# Patient Record
Sex: FEMALE | Race: ASIAN | NOT HISPANIC OR LATINO
[De-identification: names, ages, dates, MRNs, and addresses within clinical notes are randomized per-mention and may not be internally consistent; named-entity substitution may affect disease eponyms.]

---

## 2021-04-09 PROBLEM — Z00.00 ENCOUNTER FOR PREVENTIVE HEALTH EXAMINATION: Status: ACTIVE | Noted: 2021-04-09

## 2021-04-19 ENCOUNTER — APPOINTMENT (OUTPATIENT)
Dept: PLASTIC SURGERY | Facility: CLINIC | Age: 26
End: 2021-04-19
Payer: MEDICAID

## 2021-04-19 VITALS — BODY MASS INDEX: 24.8 KG/M2 | TEMPERATURE: 97.3 F | WEIGHT: 140 LBS | HEIGHT: 63 IN

## 2021-04-19 DIAGNOSIS — M54.2 CERVICALGIA: ICD-10-CM

## 2021-04-19 DIAGNOSIS — M25.519 PAIN IN UNSPECIFIED SHOULDER: ICD-10-CM

## 2021-04-19 DIAGNOSIS — G89.29 CERVICALGIA: ICD-10-CM

## 2021-04-19 PROCEDURE — 99072 ADDL SUPL MATRL&STAF TM PHE: CPT

## 2021-04-19 PROCEDURE — 99204 OFFICE O/P NEW MOD 45 MIN: CPT

## 2021-04-20 PROBLEM — M25.519 SHOULDER JOINT PAIN: Status: ACTIVE | Noted: 2021-04-20

## 2021-04-20 PROBLEM — M54.2 NECK PAIN, CHRONIC: Status: ACTIVE | Noted: 2021-04-20

## 2021-04-22 NOTE — PHYSICAL EXAM
[NI] : Normal [de-identified] : Bilateral breast ptosis, macromastia S:N: (L) 23.5cm, (R) 23.5cm, Base Width: (L) 13.5cm, (R) 13 cm, N:IMF: (L) 13.5cm, (R) 13 cm, Aerola: (L) 6.5cm, (R) 7 cm. No abnormal palpable masses/cysts.\par

## 2021-04-22 NOTE — HISTORY OF PRESENT ILLNESS
[FreeTextEntry1] : Pt current bra size is a 32E, she would like to consult for bilateral breast reduction.  Pt states she develops rashes in between bilateral breast folds, pt had tried topical cream for candida infection but see little improvement. \par Pt experiences shoulder groove indentation, back pain, neck pain, shoulder pain, and tension headaches. She is having difficulty focusing at work due to headaches, unable to fully raise her bilateral arms throughout the day due to shoulder pain. \par Her pain level is a 7/10, she takes OTC medications for pain relief for about one to two years. She have tried PT for a year in the past but see little relief.\par  Pt has no personal or family history of breast cancer. \par  No history of cystic breast tissue, clots, or miscarriages.\par

## 2021-04-28 ENCOUNTER — RESULT REVIEW (OUTPATIENT)
Age: 26
End: 2021-04-28

## 2021-04-28 ENCOUNTER — APPOINTMENT (OUTPATIENT)
Dept: MAMMOGRAPHY | Facility: CLINIC | Age: 26
End: 2021-04-28
Payer: MEDICAID

## 2021-04-28 ENCOUNTER — OUTPATIENT (OUTPATIENT)
Dept: OUTPATIENT SERVICES | Facility: HOSPITAL | Age: 26
LOS: 1 days | End: 2021-04-28

## 2021-04-28 ENCOUNTER — APPOINTMENT (OUTPATIENT)
Dept: ULTRASOUND IMAGING | Facility: CLINIC | Age: 26
End: 2021-04-28
Payer: MEDICAID

## 2021-04-28 PROCEDURE — 76641 ULTRASOUND BREAST COMPLETE: CPT | Mod: 26,50

## 2021-06-01 ENCOUNTER — OUTPATIENT (OUTPATIENT)
Dept: OUTPATIENT SERVICES | Facility: HOSPITAL | Age: 26
LOS: 1 days | End: 2021-06-01

## 2021-06-01 VITALS
HEIGHT: 64 IN | SYSTOLIC BLOOD PRESSURE: 110 MMHG | TEMPERATURE: 98 F | RESPIRATION RATE: 16 BRPM | DIASTOLIC BLOOD PRESSURE: 70 MMHG | OXYGEN SATURATION: 99 % | WEIGHT: 147.93 LBS | HEART RATE: 95 BPM

## 2021-06-01 DIAGNOSIS — N62 HYPERTROPHY OF BREAST: ICD-10-CM

## 2021-06-01 DIAGNOSIS — Z98.890 OTHER SPECIFIED POSTPROCEDURAL STATES: Chronic | ICD-10-CM

## 2021-06-01 DIAGNOSIS — K08.409 PARTIAL LOSS OF TEETH, UNSPECIFIED CAUSE, UNSPECIFIED CLASS: Chronic | ICD-10-CM

## 2021-06-01 LAB
HCG UR QL: NEGATIVE — SIGNIFICANT CHANGE UP
HCT VFR BLD CALC: 39.9 % — SIGNIFICANT CHANGE UP (ref 34.5–45)
HGB BLD-MCNC: 13.4 G/DL — SIGNIFICANT CHANGE UP (ref 11.5–15.5)
MCHC RBC-ENTMCNC: 24.1 PG — LOW (ref 27–34)
MCHC RBC-ENTMCNC: 33.6 GM/DL — SIGNIFICANT CHANGE UP (ref 32–36)
MCV RBC AUTO: 71.9 FL — LOW (ref 80–100)
NRBC # BLD: 0 /100 WBCS — SIGNIFICANT CHANGE UP
NRBC # FLD: 0 K/UL — SIGNIFICANT CHANGE UP
PLATELET # BLD AUTO: 540 K/UL — HIGH (ref 150–400)
RBC # BLD: 5.55 M/UL — HIGH (ref 3.8–5.2)
RBC # FLD: 14 % — SIGNIFICANT CHANGE UP (ref 10.3–14.5)
WBC # BLD: 8.57 K/UL — SIGNIFICANT CHANGE UP (ref 3.8–10.5)
WBC # FLD AUTO: 8.57 K/UL — SIGNIFICANT CHANGE UP (ref 3.8–10.5)

## 2021-06-01 RX ORDER — SODIUM CHLORIDE 9 MG/ML
3 INJECTION INTRAMUSCULAR; INTRAVENOUS; SUBCUTANEOUS EVERY 8 HOURS
Refills: 0 | Status: DISCONTINUED | OUTPATIENT
Start: 2021-06-10 | End: 2021-06-24

## 2021-06-01 RX ORDER — SODIUM CHLORIDE 9 MG/ML
1000 INJECTION, SOLUTION INTRAVENOUS
Refills: 0 | Status: DISCONTINUED | OUTPATIENT
Start: 2021-06-10 | End: 2021-06-24

## 2021-06-01 NOTE — H&P PST ADULT - NSICDXFAMILYHX_GEN_ALL_CORE_FT
FAMILY HISTORY:  Grandparent  Still living? Unknown  FH: breast cancer, Age at diagnosis: Age Unknown

## 2021-06-01 NOTE — H&P PST ADULT - HISTORY OF PRESENT ILLNESS
27 y/o female presents to PST preop for bilateral breast reduction. pt reports chronic bilateral shoulder and neck pain that has not been relieved with OTC pain meds or PT.

## 2021-06-01 NOTE — H&P PST ADULT - HEALTH CARE MAINTENANCE
Pt states she received Moderna COVID-19 vaccine   1st dose 4/11/21  2nd dose 5/4/21- card not present during PST visit

## 2021-06-01 NOTE — H&P PST ADULT - NSICDXPROBLEM_GEN_ALL_CORE_FT
PROBLEM DIAGNOSES  Problem: Hypertrophy of breast  Assessment and Plan: preop for bilateral breast reduction on 6/10/21  preop instructions given, pt verbalized understanding  GI prophylaxis and chlorhexidine wash provided  pt is scheduled for COVID testing preop         PROBLEM DIAGNOSES  Problem: Hypertrophy of breast  Assessment and Plan: preop for bilateral breast reduction on 6/10/21  preop instructions given, pt verbalized understanding  GI prophylaxis and chlorhexidine wash provided  pt is scheduled for COVID testing preop  ECHO requested from PCP

## 2021-06-01 NOTE — H&P PST ADULT - NSANTHBMIRD_ENT_A_CORE
Add 52 Modifier (Optional): no Detail Level: Detailed Consent: The patient's consent was obtained including but not limited to risks of crusting, scabbing, blistering, scarring, darker or lighter pigmentary change, recurrence, incomplete removal and infection. Post-Care Instructions: I reviewed with the patient in detail post-care instructions. Patient is to wear sunprotection, and avoid picking at any of the treated lesions. Pt may apply Vaseline to crusted or scabbing areas. Medical Necessity Clause: This procedure was medically necessary because the lesions that were treated were: Medical Necessity Information: It is in your best interest to select a reason for this procedure from the list below. All of these items fulfill various CMS LCD requirements except the new and changing color options. No

## 2021-06-01 NOTE — H&P PST ADULT - NS PRO FEM REPRO HEALTH SCREEN
breast US Apil 2021. pt states benign breast nodule noted bilaterally breast US Apil 2021. pt reports benign breast nodules noted bilaterally

## 2021-06-01 NOTE — H&P PST ADULT - RS GEN PE MLT RESP DETAILS PC
airway patent/breath sounds equal/respirations non-labored/clear to auscultation bilaterally/no wheezes

## 2021-06-06 DIAGNOSIS — Z01.818 ENCOUNTER FOR OTHER PREPROCEDURAL EXAMINATION: ICD-10-CM

## 2021-06-07 ENCOUNTER — APPOINTMENT (OUTPATIENT)
Dept: DISASTER EMERGENCY | Facility: CLINIC | Age: 26
End: 2021-06-07

## 2021-06-07 PROBLEM — N62 HYPERTROPHY OF BREAST: Chronic | Status: ACTIVE | Noted: 2021-06-01

## 2021-06-08 LAB — SARS-COV-2 N GENE NPH QL NAA+PROBE: NOT DETECTED

## 2021-06-09 ENCOUNTER — TRANSCRIPTION ENCOUNTER (OUTPATIENT)
Age: 26
End: 2021-06-09

## 2021-06-09 NOTE — ASU PATIENT PROFILE, ADULT - TEACHING/LEARNING FACTORS INFLUENCE READINESS TO LEARN
skin normal color for race, warm, dry and intact. 1 cm obliquely orientated laceration over nasal bridge.
none

## 2021-06-10 ENCOUNTER — RESULT REVIEW (OUTPATIENT)
Age: 26
End: 2021-06-10

## 2021-06-10 ENCOUNTER — OUTPATIENT (OUTPATIENT)
Dept: OUTPATIENT SERVICES | Facility: HOSPITAL | Age: 26
LOS: 1 days | Discharge: HOME | End: 2021-06-10
Payer: MEDICAID

## 2021-06-10 ENCOUNTER — APPOINTMENT (OUTPATIENT)
Dept: PLASTIC SURGERY | Facility: HOSPITAL | Age: 26
End: 2021-06-10
Payer: MEDICAID

## 2021-06-10 VITALS
OXYGEN SATURATION: 100 % | RESPIRATION RATE: 14 BRPM | TEMPERATURE: 97 F | HEART RATE: 104 BPM | DIASTOLIC BLOOD PRESSURE: 57 MMHG | SYSTOLIC BLOOD PRESSURE: 105 MMHG

## 2021-06-10 VITALS
OXYGEN SATURATION: 96 % | RESPIRATION RATE: 16 BRPM | SYSTOLIC BLOOD PRESSURE: 118 MMHG | DIASTOLIC BLOOD PRESSURE: 80 MMHG | TEMPERATURE: 98 F | HEART RATE: 92 BPM | HEIGHT: 64 IN | WEIGHT: 147.93 LBS

## 2021-06-10 DIAGNOSIS — N62 HYPERTROPHY OF BREAST: ICD-10-CM

## 2021-06-10 DIAGNOSIS — Z98.890 OTHER SPECIFIED POSTPROCEDURAL STATES: Chronic | ICD-10-CM

## 2021-06-10 DIAGNOSIS — K08.409 PARTIAL LOSS OF TEETH, UNSPECIFIED CAUSE, UNSPECIFIED CLASS: Chronic | ICD-10-CM

## 2021-06-10 LAB — HCG UR QL: NEGATIVE — SIGNIFICANT CHANGE UP

## 2021-06-10 PROCEDURE — 88305 TISSUE EXAM BY PATHOLOGIST: CPT | Mod: 26

## 2021-06-10 PROCEDURE — 19318 BREAST REDUCTION: CPT | Mod: LT

## 2021-06-10 RX ORDER — ONDANSETRON 8 MG/1
4 TABLET, FILM COATED ORAL ONCE
Refills: 0 | Status: DISCONTINUED | OUTPATIENT
Start: 2021-06-10 | End: 2021-06-24

## 2021-06-10 RX ORDER — LANOLIN ALCOHOL/MO/W.PET/CERES
1 CREAM (GRAM) TOPICAL
Qty: 0 | Refills: 0 | DISCHARGE

## 2021-06-10 RX ORDER — HYDROMORPHONE HYDROCHLORIDE 2 MG/ML
0.5 INJECTION INTRAMUSCULAR; INTRAVENOUS; SUBCUTANEOUS ONCE
Refills: 0 | Status: DISCONTINUED | OUTPATIENT
Start: 2021-06-10 | End: 2021-06-10

## 2021-06-10 RX ORDER — FENTANYL CITRATE 50 UG/ML
25 INJECTION INTRAVENOUS
Refills: 0 | Status: DISCONTINUED | OUTPATIENT
Start: 2021-06-10 | End: 2021-06-10

## 2021-06-10 RX ORDER — PSEUDOEPHEDRINE HCL 30 MG
1 TABLET ORAL
Qty: 0 | Refills: 0 | DISCHARGE

## 2021-06-10 RX ORDER — ASCORBIC ACID 60 MG
1 TABLET,CHEWABLE ORAL
Qty: 0 | Refills: 0 | DISCHARGE

## 2021-06-10 RX ORDER — OXYCODONE HYDROCHLORIDE 5 MG/1
1 TABLET ORAL
Qty: 15 | Refills: 0
Start: 2021-06-10 | End: 2021-06-11

## 2021-06-10 RX ADMIN — FENTANYL CITRATE 25 MICROGRAM(S): 50 INJECTION INTRAVENOUS at 15:37

## 2021-06-10 RX ADMIN — FENTANYL CITRATE 25 MICROGRAM(S): 50 INJECTION INTRAVENOUS at 16:00

## 2021-06-10 RX ADMIN — HYDROMORPHONE HYDROCHLORIDE 0.5 MILLIGRAM(S): 2 INJECTION INTRAMUSCULAR; INTRAVENOUS; SUBCUTANEOUS at 16:12

## 2021-06-10 RX ADMIN — HYDROMORPHONE HYDROCHLORIDE 0.5 MILLIGRAM(S): 2 INJECTION INTRAMUSCULAR; INTRAVENOUS; SUBCUTANEOUS at 16:28

## 2021-06-10 NOTE — ASU DISCHARGE PLAN (ADULT/PEDIATRIC) - CARE PROVIDER_API CALL
Jacinto Santiago  PLASTIC SURGERY  [address] [place] [id number]  Phone: [phone]  Fax: [phone]  Follow Up Time:

## 2021-06-10 NOTE — ASU DISCHARGE PLAN (ADULT/PEDIATRIC) - FOLLOW UP APPOINTMENTS
may also call Recovery Room (PACU) 24/7 @ [phone]/[facility]

## 2021-06-10 NOTE — ASU DISCHARGE PLAN (ADULT/PEDIATRIC) - ASU DC SPECIAL INSTRUCTIONSFT
Please follow up with Dr. Santiago within 1 week of discharge from the hospital. You may call [phone] to schedule an appointment.     You may shower 24 hours following surgery. Please continue to wear your surgical bra for comfort and support. So not scrub your incisions, let water run over them and pat dry.     Please take the medications sent to your pharmacy as directed for pain.

## 2021-06-16 LAB — SURGICAL PATHOLOGY STUDY: SIGNIFICANT CHANGE UP

## 2021-06-22 ENCOUNTER — APPOINTMENT (OUTPATIENT)
Dept: PLASTIC SURGERY | Facility: CLINIC | Age: 26
End: 2021-06-22
Payer: MEDICAID

## 2021-06-22 VITALS — BODY MASS INDEX: 24.8 KG/M2 | HEIGHT: 63 IN | WEIGHT: 140 LBS | TEMPERATURE: 97.6 F

## 2021-06-22 DIAGNOSIS — N62 HYPERTROPHY OF BREAST: ICD-10-CM

## 2021-06-22 PROCEDURE — 99024 POSTOP FOLLOW-UP VISIT: CPT

## 2021-07-07 ENCOUNTER — APPOINTMENT (OUTPATIENT)
Dept: PLASTIC SURGERY | Facility: CLINIC | Age: 26
End: 2021-07-07
Payer: MEDICAID

## 2021-07-07 DIAGNOSIS — Z09 ENCOUNTER FOR FOLLOW-UP EXAMINATION AFTER COMPLETED TREATMENT FOR CONDITIONS OTHER THAN MALIGNANT NEOPLASM: ICD-10-CM

## 2021-07-07 PROCEDURE — 99024 POSTOP FOLLOW-UP VISIT: CPT

## 2021-07-07 RX ORDER — TRIAMCINOLONE ACETONIDE 0.25 MG/G
0.03 CREAM TOPICAL TWICE DAILY
Qty: 1 | Refills: 0 | Status: ACTIVE | COMMUNITY
Start: 2021-07-07 | End: 1900-01-01

## 2021-07-07 NOTE — PHYSICAL EXAM
[de-identified] : alert, calm, cooperative\par  [de-identified] : respirations are even and unlabored\par  [de-identified] : bilateral breast wise pattern incisions are well-approximated with a small area of superficial wound dehiscence appreciated at the left breast T point.  Wound bed is pink, viable, with fibrin.  No signs of fluctuance.  Bilateral inferior breasts with erythema and papules consistent with contact dermatitis (right > left)

## 2021-07-07 NOTE — HISTORY OF PRESENT ILLNESS
[FreeTextEntry1] : Desirae is a 26 year old female s/p bilateral breast reduction on 6/10/21.  Patient reports small wound along the left breast incision as well as itching and redness to the bilateral breasts for approximately 1 week.  She has not tried any topical relief but has taken 1 Benadryl, which she states did not help.  Sade reports applying bacitracin ointment to the left breast wound and feels it has improved. She denies fever, chills, malaise, or purulent discharge.

## 2021-07-11 PROBLEM — N62 MACROMASTIA: Status: ACTIVE | Noted: 2021-04-19

## 2021-07-11 NOTE — HISTORY OF PRESENT ILLNESS
[FreeTextEntry1] : 26 year female presents for follow up status post breast reduction. Patient is doing well no fever no chills no drainage pain well-controlled

## 2021-07-11 NOTE — REASON FOR VISIT
[Post Op: _________] : a [unfilled] post op visit [FreeTextEntry1] : DOS 06/10/21 s/p bilateral breast reduction.

## 2021-07-20 ENCOUNTER — APPOINTMENT (OUTPATIENT)
Dept: PLASTIC SURGERY | Facility: CLINIC | Age: 26
End: 2021-07-20

## 2023-07-20 NOTE — PACU DISCHARGE NOTE - NSPTMEETSDISCHCRITERIADT_GEN_A_CORE
10-Nicolas-2021 17:44 Island Pedicle Flap-Requiring Vessel Identification Text: The defect edges were debeveled with a #15 scalpel blade.  Given the location of the defect, shape of the defect and the proximity to free margins an island pedicle advancement flap was deemed most appropriate.  Using a sterile surgical marker, an appropriate advancement flap was drawn, based on the axial vessel mentioned above, incorporating the defect, outlining the appropriate donor tissue and placing the expected incisions within the relaxed skin tension lines where possible.    The area thus outlined was incised deep to adipose tissue with a #15 scalpel blade.  The skin margins were undermined to an appropriate distance in all directions around the primary defect and laterally outward around the island pedicle utilizing iris scissors.  There was minimal undermining beneath the pedicle flap.